# Patient Record
Sex: FEMALE
[De-identification: names, ages, dates, MRNs, and addresses within clinical notes are randomized per-mention and may not be internally consistent; named-entity substitution may affect disease eponyms.]

---

## 2017-04-25 PROBLEM — E11.59 TYPE 2 DIABETES MELLITUS WITH CIRCULATORY DISORDER, WITHOUT LONG-TERM CURRENT USE OF INSULIN (HCC): Status: ACTIVE | Noted: 2017-04-25

## 2017-04-25 PROBLEM — F41.9 ANXIETY: Status: ACTIVE | Noted: 2017-04-25

## 2017-10-24 PROBLEM — Z91.89 AT RISK FOR DIABETES MELLITUS: Status: ACTIVE | Noted: 2017-10-24

## 2018-10-02 ENCOUNTER — HOSPITAL (OUTPATIENT)
Dept: OTHER | Age: 73
End: 2018-10-02
Attending: INTERNAL MEDICINE

## 2018-10-02 LAB
ANALYZER ANC (IANC): ABNORMAL
ANION GAP SERPL CALC-SCNC: 15 MMOL/L (ref 10–20)
APTT PPP: 28 SECONDS (ref 22–30)
APTT PPP: NORMAL S
BUN SERPL-MCNC: 19 MG/DL (ref 6–20)
BUN/CREAT SERPL: 29 (ref 7–25)
CALCIUM SERPL-MCNC: 8.8 MG/DL (ref 8.4–10.2)
CHLORIDE: 107 MMOL/L (ref 98–107)
CO2 SERPL-SCNC: 27 MMOL/L (ref 21–32)
CREAT SERPL-MCNC: 0.66 MG/DL (ref 0.51–0.95)
ERYTHROCYTE [DISTWIDTH] IN BLOOD: 15.7 % (ref 11–15)
GLUCOSE SERPL-MCNC: 106 MG/DL (ref 65–99)
HEMATOCRIT: 37.3 % (ref 36–46.5)
HGB BLD-MCNC: 12.4 GM/DL (ref 12–15.5)
INR PPP: 1.4
MCH RBC QN AUTO: 30.8 PG (ref 26–34)
MCHC RBC AUTO-ENTMCNC: 33.2 GM/DL (ref 32–36.5)
MCV RBC AUTO: 92.6 FL (ref 78–100)
NRBC (NRBCRE): ABNORMAL
PLATELET # BLD: 195 THOUSAND/MCL (ref 140–450)
POTASSIUM SERPL-SCNC: 3.8 MMOL/L (ref 3.4–5.1)
PROTHROMBIN TIME: 13.6 SECONDS (ref 9.7–11.8)
PROTHROMBIN TIME: ABNORMAL
RBC # BLD: 4.03 MILLION/MCL (ref 4–5.2)
SODIUM SERPL-SCNC: 145 MMOL/L (ref 135–145)
WBC # BLD: 7.8 THOUSAND/MCL (ref 4.2–11)

## 2018-10-11 PROBLEM — I27.20 PULMONARY HTN (HCC): Status: ACTIVE | Noted: 2018-10-11

## 2019-08-08 PROBLEM — M10.9 ACUTE GOUT OF LEFT ANKLE, UNSPECIFIED CAUSE: Status: ACTIVE | Noted: 2019-08-08

## 2019-10-28 PROBLEM — I48.0 PAROXYSMAL ATRIAL FIBRILLATION (HCC): Status: ACTIVE | Noted: 2019-10-28

## 2021-10-01 PROBLEM — F10.20 ALCOHOLISM (HCC): Status: ACTIVE | Noted: 2021-10-01

## 2021-10-04 PROBLEM — F10.239 ALCOHOL WITHDRAWAL (HCC): Status: ACTIVE | Noted: 2021-10-04

## 2021-10-20 PROBLEM — Z91.89 AT RISK FOR DIABETES MELLITUS: Status: RESOLVED | Noted: 2017-10-24 | Resolved: 2021-10-20

## 2021-10-27 NOTE — H&P (VIEW-ONLY)
Patient ID: Jonathon Zafar     Chief Complaint:    Patient presents with:  Pre-Op: pre op right total hip 11/1        HPI:    Jonathon Zafar is a 78year old female who presents today for Patient presents with:  Pre-Op: pre op right total hip 11/1 EH  .  Se nausea and vomiting)    • RLS (restless legs syndrome)    • SLEEP APNEA     severe, AHI 23, REM AHI 65, O2 asif 69%.   AutoPAP 8-20   • Sleep apnea     CPAP     Past Surgical History:   Procedure Laterality Date   • ABLATION     • CABG      denies open hea PAROXETINE 10 MG Oral Tab TAKE 1 TABLET BY MOUTH EVERY DAY (Patient taking differently: TAKE 1/4 TABLET BY MOUTH EVERY DAY) 30 tablet 1   • HYDROcodone-acetaminophen (NORCO) 5-325 MG Oral Tab 1-2 tabs po q6 hours prn 40 tablet 0   • Pramipexole Dihydrochlo Height as of 10/13/21: 5' (1.524 m). Weight as of 10/13/21: 167 lb (75.8 kg).     antalgic gait with assistive device    Right hip: Limited internal (5 degrees) and external rotation (15 degrees) with pain in the groin region, Limited abduction (0-15/20 vs. BEATRIZ discussed. The patient wishes to proceed with right total hip replacement.       One or more of the conservative treatments have been tried and failed for three months or more except in special circumstances where delay of definitive care is not ap utilized. Following the completion of the discussion, the patient expressed understanding of this planned course of care, all their questions were answered and consent will be obtained preoperatively.     The risks, benefits and alternatives of surgery wer your lungs or brain. A blood clot in your lungs can cause chest pain, trouble breathing and possibly death. A blood clot in your brain can cause a stroke. These problems can be life-threatening. Pain medication discussed.       no smoking  no diabetes

## 2021-10-29 ENCOUNTER — LABORATORY ENCOUNTER (OUTPATIENT)
Dept: LAB | Facility: HOSPITAL | Age: 76
End: 2021-10-29
Payer: MEDICARE

## 2021-10-29 ENCOUNTER — LAB ENCOUNTER (OUTPATIENT)
Dept: LAB | Facility: HOSPITAL | Age: 76
End: 2021-10-29
Attending: ORTHOPAEDIC SURGERY
Payer: MEDICARE

## 2021-10-29 ENCOUNTER — ANESTHESIA EVENT (OUTPATIENT)
Dept: SURGERY | Facility: HOSPITAL | Age: 76
DRG: 470 | End: 2021-10-29
Payer: MEDICARE

## 2021-10-29 DIAGNOSIS — Z01.818 PRE-OP EXAM: ICD-10-CM

## 2021-10-29 DIAGNOSIS — Z01.818 PRE-OP TESTING: ICD-10-CM

## 2021-10-29 PROCEDURE — 80051 ELECTROLYTE PANEL: CPT

## 2021-10-29 PROCEDURE — 87081 CULTURE SCREEN ONLY: CPT

## 2021-10-29 PROCEDURE — 86900 BLOOD TYPING SEROLOGIC ABO: CPT

## 2021-10-29 PROCEDURE — 86850 RBC ANTIBODY SCREEN: CPT

## 2021-10-29 PROCEDURE — 36415 COLL VENOUS BLD VENIPUNCTURE: CPT

## 2021-10-29 PROCEDURE — 86901 BLOOD TYPING SEROLOGIC RH(D): CPT

## 2021-10-29 PROCEDURE — 85610 PROTHROMBIN TIME: CPT

## 2021-11-01 ENCOUNTER — HOSPITAL ENCOUNTER (INPATIENT)
Facility: HOSPITAL | Age: 76
LOS: 1 days | Discharge: HOME HEALTH | DRG: 470 | End: 2021-11-03
Attending: ORTHOPAEDIC SURGERY | Admitting: ORTHOPAEDIC SURGERY
Payer: MEDICARE

## 2021-11-01 ENCOUNTER — ANESTHESIA (OUTPATIENT)
Dept: SURGERY | Facility: HOSPITAL | Age: 76
DRG: 470 | End: 2021-11-01
Payer: MEDICARE

## 2021-11-01 ENCOUNTER — APPOINTMENT (OUTPATIENT)
Dept: GENERAL RADIOLOGY | Facility: HOSPITAL | Age: 76
DRG: 470 | End: 2021-11-01
Attending: ORTHOPAEDIC SURGERY
Payer: MEDICARE

## 2021-11-01 DIAGNOSIS — Z01.818 PRE-OP TESTING: Primary | ICD-10-CM

## 2021-11-01 DIAGNOSIS — M16.11 PRIMARY OSTEOARTHRITIS OF RIGHT HIP: ICD-10-CM

## 2021-11-01 PROCEDURE — 82962 GLUCOSE BLOOD TEST: CPT

## 2021-11-01 PROCEDURE — BQ101ZZ FLUOROSCOPY OF RIGHT HIP USING LOW OSMOLAR CONTRAST: ICD-10-PCS | Performed by: ORTHOPAEDIC SURGERY

## 2021-11-01 PROCEDURE — 76000 FLUOROSCOPY <1 HR PHYS/QHP: CPT | Performed by: ORTHOPAEDIC SURGERY

## 2021-11-01 PROCEDURE — 88304 TISSUE EXAM BY PATHOLOGIST: CPT | Performed by: ORTHOPAEDIC SURGERY

## 2021-11-01 PROCEDURE — 85610 PROTHROMBIN TIME: CPT

## 2021-11-01 PROCEDURE — 97530 THERAPEUTIC ACTIVITIES: CPT

## 2021-11-01 PROCEDURE — 94660 CPAP INITIATION&MGMT: CPT

## 2021-11-01 PROCEDURE — 97161 PT EVAL LOW COMPLEX 20 MIN: CPT

## 2021-11-01 PROCEDURE — 88311 DECALCIFY TISSUE: CPT | Performed by: ORTHOPAEDIC SURGERY

## 2021-11-01 PROCEDURE — 5A09357 ASSISTANCE WITH RESPIRATORY VENTILATION, LESS THAN 24 CONSECUTIVE HOURS, CONTINUOUS POSITIVE AIRWAY PRESSURE: ICD-10-PCS | Performed by: ORTHOPAEDIC SURGERY

## 2021-11-01 PROCEDURE — 0SR9039 REPLACEMENT OF RIGHT HIP JOINT WITH CERAMIC SYNTHETIC SUBSTITUTE, CEMENTED, OPEN APPROACH: ICD-10-PCS | Performed by: ORTHOPAEDIC SURGERY

## 2021-11-01 DEVICE — IMPLANTABLE DEVICE
Type: IMPLANTABLE DEVICE | Site: HIP | Status: FUNCTIONAL
Brand: PERSONA®

## 2021-11-01 DEVICE — IMPLANTABLE DEVICE
Type: IMPLANTABLE DEVICE | Site: HIP | Status: FUNCTIONAL
Brand: REFOBACIN® BONE CEMENT R

## 2021-11-01 DEVICE — BIOLOX® DELTA, CERAMIC FEMORAL HEAD, S, Ø 36/-3.5, TAPER 12/14
Type: IMPLANTABLE DEVICE | Site: HIP | Status: FUNCTIONAL
Brand: BIOLOX® DELTA

## 2021-11-01 DEVICE — IMPLANTABLE DEVICE: Type: IMPLANTABLE DEVICE | Site: HIP | Status: FUNCTIONAL

## 2021-11-01 DEVICE — IMPLANTABLE DEVICE
Type: IMPLANTABLE DEVICE | Site: HIP | Status: FUNCTIONAL
Brand: G7® ACETABULAR SYSTEM

## 2021-11-01 DEVICE — KIT FEM BONE CEMENT RESTRICTOR: Type: IMPLANTABLE DEVICE | Site: HIP | Status: FUNCTIONAL

## 2021-11-01 RX ORDER — ONDANSETRON 2 MG/ML
4 INJECTION INTRAMUSCULAR; INTRAVENOUS AS NEEDED
Status: DISCONTINUED | OUTPATIENT
Start: 2021-11-01 | End: 2021-11-01 | Stop reason: HOSPADM

## 2021-11-01 RX ORDER — LEVOTHYROXINE SODIUM 0.1 MG/1
100 TABLET ORAL
COMMUNITY
End: 2022-01-05

## 2021-11-01 RX ORDER — OXYCODONE HYDROCHLORIDE 5 MG/1
5 TABLET ORAL EVERY 4 HOURS PRN
Status: DISCONTINUED | OUTPATIENT
Start: 2021-11-01 | End: 2021-11-03

## 2021-11-01 RX ORDER — PAROXETINE 10 MG/1
2.5 TABLET, FILM COATED ORAL DAILY
Status: DISCONTINUED | OUTPATIENT
Start: 2021-11-01 | End: 2021-11-03

## 2021-11-01 RX ORDER — HYDROCODONE BITARTRATE AND ACETAMINOPHEN 5; 325 MG/1; MG/1
1 TABLET ORAL AS NEEDED
Status: DISCONTINUED | OUTPATIENT
Start: 2021-11-01 | End: 2021-11-01 | Stop reason: HOSPADM

## 2021-11-01 RX ORDER — POLYETHYLENE GLYCOL 3350 17 G/17G
17 POWDER, FOR SOLUTION ORAL DAILY PRN
Status: DISCONTINUED | OUTPATIENT
Start: 2021-11-01 | End: 2021-11-03

## 2021-11-01 RX ORDER — LANSOPRAZOLE 30 MG/1
30 CAPSULE, DELAYED RELEASE ORAL
COMMUNITY

## 2021-11-01 RX ORDER — MEPERIDINE HYDROCHLORIDE 25 MG/ML
12.5 INJECTION INTRAMUSCULAR; INTRAVENOUS; SUBCUTANEOUS AS NEEDED
Status: DISCONTINUED | OUTPATIENT
Start: 2021-11-01 | End: 2021-11-01 | Stop reason: HOSPADM

## 2021-11-01 RX ORDER — CEFAZOLIN SODIUM/WATER 2 G/20 ML
2 SYRINGE (ML) INTRAVENOUS EVERY 8 HOURS
Status: COMPLETED | OUTPATIENT
Start: 2021-11-01 | End: 2021-11-01

## 2021-11-01 RX ORDER — POTASSIUM CHLORIDE 750 MG/1
10 TABLET, FILM COATED, EXTENDED RELEASE ORAL 2 TIMES DAILY
Status: DISCONTINUED | OUTPATIENT
Start: 2021-11-01 | End: 2021-11-02

## 2021-11-01 RX ORDER — SODIUM CHLORIDE 9 MG/ML
INJECTION, SOLUTION INTRAVENOUS CONTINUOUS
Status: DISCONTINUED | OUTPATIENT
Start: 2021-11-01 | End: 2021-11-03

## 2021-11-01 RX ORDER — DILTIAZEM HYDROCHLORIDE 120 MG/1
120 CAPSULE, EXTENDED RELEASE ORAL DAILY
Status: DISCONTINUED | OUTPATIENT
Start: 2021-11-01 | End: 2021-11-03

## 2021-11-01 RX ORDER — PRAMIPEXOLE DIHYDROCHLORIDE 0.25 MG/1
0.5 TABLET ORAL NIGHTLY
Status: DISCONTINUED | OUTPATIENT
Start: 2021-11-01 | End: 2021-11-03

## 2021-11-01 RX ORDER — SODIUM PHOSPHATE, DIBASIC AND SODIUM PHOSPHATE, MONOBASIC 7; 19 G/133ML; G/133ML
1 ENEMA RECTAL ONCE AS NEEDED
Status: DISCONTINUED | OUTPATIENT
Start: 2021-11-01 | End: 2021-11-03

## 2021-11-01 RX ORDER — KETOROLAC TROMETHAMINE 30 MG/ML
30 INJECTION, SOLUTION INTRAMUSCULAR; INTRAVENOUS EVERY 6 HOURS
Status: DISCONTINUED | OUTPATIENT
Start: 2021-11-01 | End: 2021-11-01

## 2021-11-01 RX ORDER — TRAMADOL HYDROCHLORIDE 50 MG/1
50 TABLET ORAL EVERY 6 HOURS
Status: DISCONTINUED | OUTPATIENT
Start: 2021-11-01 | End: 2021-11-03

## 2021-11-01 RX ORDER — HYDROCODONE BITARTRATE AND ACETAMINOPHEN 5; 325 MG/1; MG/1
2 TABLET ORAL AS NEEDED
Status: DISCONTINUED | OUTPATIENT
Start: 2021-11-01 | End: 2021-11-01 | Stop reason: HOSPADM

## 2021-11-01 RX ORDER — OXYCODONE HYDROCHLORIDE 5 MG/1
2.5 TABLET ORAL EVERY 4 HOURS PRN
Status: DISCONTINUED | OUTPATIENT
Start: 2021-11-01 | End: 2021-11-03

## 2021-11-01 RX ORDER — EZETIMIBE 10 MG/1
10 TABLET ORAL NIGHTLY
COMMUNITY
End: 2021-12-24

## 2021-11-01 RX ORDER — ACETAMINOPHEN 500 MG
1000 TABLET ORAL ONCE
Status: DISCONTINUED | OUTPATIENT
Start: 2021-11-01 | End: 2021-11-01

## 2021-11-01 RX ORDER — WARFARIN SODIUM 5 MG/1
5 TABLET ORAL
Status: DISCONTINUED | OUTPATIENT
Start: 2021-11-01 | End: 2021-11-01

## 2021-11-01 RX ORDER — ENOXAPARIN SODIUM 100 MG/ML
40 INJECTION SUBCUTANEOUS DAILY
Status: DISCONTINUED | OUTPATIENT
Start: 2021-11-02 | End: 2021-11-03

## 2021-11-01 RX ORDER — EZETIMIBE 10 MG/1
10 TABLET ORAL NIGHTLY
Status: DISCONTINUED | OUTPATIENT
Start: 2021-11-01 | End: 2021-11-03

## 2021-11-01 RX ORDER — ONDANSETRON 2 MG/ML
4 INJECTION INTRAMUSCULAR; INTRAVENOUS EVERY 4 HOURS PRN
Status: ACTIVE | OUTPATIENT
Start: 2021-11-01 | End: 2021-11-03

## 2021-11-01 RX ORDER — KETAMINE HYDROCHLORIDE 50 MG/ML
INJECTION, SOLUTION, CONCENTRATE INTRAMUSCULAR; INTRAVENOUS AS NEEDED
Status: DISCONTINUED | OUTPATIENT
Start: 2021-11-01 | End: 2021-11-01 | Stop reason: SURG

## 2021-11-01 RX ORDER — LEVOTHYROXINE SODIUM 0.1 MG/1
100 TABLET ORAL
Status: DISCONTINUED | OUTPATIENT
Start: 2021-11-01 | End: 2021-11-03

## 2021-11-01 RX ORDER — NALOXONE HYDROCHLORIDE 0.4 MG/ML
80 INJECTION, SOLUTION INTRAMUSCULAR; INTRAVENOUS; SUBCUTANEOUS AS NEEDED
Status: DISCONTINUED | OUTPATIENT
Start: 2021-11-01 | End: 2021-11-01 | Stop reason: HOSPADM

## 2021-11-01 RX ORDER — KETOROLAC TROMETHAMINE 30 MG/ML
INJECTION, SOLUTION INTRAMUSCULAR; INTRAVENOUS AS NEEDED
Status: DISCONTINUED | OUTPATIENT
Start: 2021-11-01 | End: 2021-11-01 | Stop reason: SURG

## 2021-11-01 RX ORDER — ACETAMINOPHEN 325 MG/1
650 TABLET ORAL ONCE
Status: COMPLETED | OUTPATIENT
Start: 2021-11-01 | End: 2021-11-01

## 2021-11-01 RX ORDER — AMLODIPINE BESYLATE AND BENAZEPRIL HYDROCHLORIDE 5; 40 MG/1; MG/1
1 CAPSULE ORAL DAILY
Status: DISCONTINUED | OUTPATIENT
Start: 2021-11-01 | End: 2021-11-01 | Stop reason: SDUPTHER

## 2021-11-01 RX ORDER — AMLODIPINE BESYLATE 5 MG/1
5 TABLET ORAL DAILY
Status: DISCONTINUED | OUTPATIENT
Start: 2021-11-02 | End: 2021-11-03

## 2021-11-01 RX ORDER — ATORVASTATIN CALCIUM 80 MG/1
80 TABLET, FILM COATED ORAL NIGHTLY
COMMUNITY
End: 2021-11-09

## 2021-11-01 RX ORDER — BUPIVACAINE HYDROCHLORIDE 7.5 MG/ML
INJECTION, SOLUTION INTRASPINAL AS NEEDED
Status: DISCONTINUED | OUTPATIENT
Start: 2021-11-01 | End: 2021-11-01 | Stop reason: SURG

## 2021-11-01 RX ORDER — ACETAMINOPHEN 325 MG/1
650 TABLET ORAL 4 TIMES DAILY
Status: DISCONTINUED | OUTPATIENT
Start: 2021-11-01 | End: 2021-11-03

## 2021-11-01 RX ORDER — LISINOPRIL 40 MG/1
40 TABLET ORAL DAILY
Status: DISCONTINUED | OUTPATIENT
Start: 2021-11-02 | End: 2021-11-03

## 2021-11-01 RX ORDER — DOCUSATE SODIUM 100 MG/1
100 CAPSULE, LIQUID FILLED ORAL 2 TIMES DAILY
Status: DISCONTINUED | OUTPATIENT
Start: 2021-11-01 | End: 2021-11-03

## 2021-11-01 RX ORDER — HYDROMORPHONE HYDROCHLORIDE 1 MG/ML
0.4 INJECTION, SOLUTION INTRAMUSCULAR; INTRAVENOUS; SUBCUTANEOUS EVERY 2 HOUR PRN
Status: DISCONTINUED | OUTPATIENT
Start: 2021-11-01 | End: 2021-11-03

## 2021-11-01 RX ORDER — INSULIN ASPART 100 [IU]/ML
INJECTION, SOLUTION INTRAVENOUS; SUBCUTANEOUS ONCE
Status: DISCONTINUED | OUTPATIENT
Start: 2021-11-01 | End: 2021-11-01 | Stop reason: HOSPADM

## 2021-11-01 RX ORDER — CEFAZOLIN SODIUM/WATER 2 G/20 ML
2 SYRINGE (ML) INTRAVENOUS ONCE
Status: COMPLETED | OUTPATIENT
Start: 2021-11-01 | End: 2021-11-01

## 2021-11-01 RX ORDER — MIDAZOLAM HYDROCHLORIDE 1 MG/ML
INJECTION INTRAMUSCULAR; INTRAVENOUS AS NEEDED
Status: DISCONTINUED | OUTPATIENT
Start: 2021-11-01 | End: 2021-11-01 | Stop reason: SURG

## 2021-11-01 RX ORDER — ACETAMINOPHEN 325 MG/1
TABLET ORAL
Status: COMPLETED
Start: 2021-11-01 | End: 2021-11-01

## 2021-11-01 RX ORDER — ATORVASTATIN CALCIUM 40 MG/1
80 TABLET, FILM COATED ORAL NIGHTLY
Status: DISCONTINUED | OUTPATIENT
Start: 2021-11-01 | End: 2021-11-03

## 2021-11-01 RX ORDER — SODIUM CHLORIDE, SODIUM LACTATE, POTASSIUM CHLORIDE, CALCIUM CHLORIDE 600; 310; 30; 20 MG/100ML; MG/100ML; MG/100ML; MG/100ML
INJECTION, SOLUTION INTRAVENOUS CONTINUOUS
Status: DISCONTINUED | OUTPATIENT
Start: 2021-11-01 | End: 2021-11-01 | Stop reason: HOSPADM

## 2021-11-01 RX ORDER — DEXAMETHASONE SODIUM PHOSPHATE 4 MG/ML
VIAL (ML) INJECTION AS NEEDED
Status: DISCONTINUED | OUTPATIENT
Start: 2021-11-01 | End: 2021-11-01 | Stop reason: SURG

## 2021-11-01 RX ORDER — DEXTROSE MONOHYDRATE 25 G/50ML
50 INJECTION, SOLUTION INTRAVENOUS
Status: DISCONTINUED | OUTPATIENT
Start: 2021-11-01 | End: 2021-11-01 | Stop reason: HOSPADM

## 2021-11-01 RX ORDER — TEMAZEPAM 15 MG/1
15 CAPSULE ORAL NIGHTLY PRN
Status: DISCONTINUED | OUTPATIENT
Start: 2021-11-01 | End: 2021-11-03

## 2021-11-01 RX ORDER — ONDANSETRON 2 MG/ML
INJECTION INTRAMUSCULAR; INTRAVENOUS AS NEEDED
Status: DISCONTINUED | OUTPATIENT
Start: 2021-11-01 | End: 2021-11-01 | Stop reason: SURG

## 2021-11-01 RX ORDER — DEXAMETHASONE SODIUM PHOSPHATE 10 MG/ML
8 INJECTION, SOLUTION INTRAMUSCULAR; INTRAVENOUS ONCE
Status: COMPLETED | OUTPATIENT
Start: 2021-11-02 | End: 2021-11-02

## 2021-11-01 RX ORDER — KETOROLAC TROMETHAMINE 15 MG/ML
15 INJECTION, SOLUTION INTRAMUSCULAR; INTRAVENOUS EVERY 6 HOURS
Status: COMPLETED | OUTPATIENT
Start: 2021-11-01 | End: 2021-11-02

## 2021-11-01 RX ORDER — DIPHENHYDRAMINE HYDROCHLORIDE 50 MG/ML
25 INJECTION INTRAMUSCULAR; INTRAVENOUS ONCE AS NEEDED
Status: ACTIVE | OUTPATIENT
Start: 2021-11-01 | End: 2021-11-01

## 2021-11-01 RX ORDER — PREGABALIN 75 MG/1
75 CAPSULE ORAL DAILY
Status: DISCONTINUED | OUTPATIENT
Start: 2021-11-01 | End: 2021-11-03

## 2021-11-01 RX ORDER — HYDROMORPHONE HYDROCHLORIDE 1 MG/ML
0.4 INJECTION, SOLUTION INTRAMUSCULAR; INTRAVENOUS; SUBCUTANEOUS EVERY 5 MIN PRN
Status: DISCONTINUED | OUTPATIENT
Start: 2021-11-01 | End: 2021-11-01 | Stop reason: HOSPADM

## 2021-11-01 RX ORDER — WARFARIN SODIUM 5 MG/1
5 TABLET ORAL
Status: COMPLETED | OUTPATIENT
Start: 2021-11-01 | End: 2021-11-01

## 2021-11-01 RX ORDER — TRANEXAMIC ACID 10 MG/ML
1000 INJECTION, SOLUTION INTRAVENOUS ONCE
Status: COMPLETED | OUTPATIENT
Start: 2021-11-01 | End: 2021-11-01

## 2021-11-01 RX ORDER — HYDROMORPHONE HYDROCHLORIDE 1 MG/ML
0.2 INJECTION, SOLUTION INTRAMUSCULAR; INTRAVENOUS; SUBCUTANEOUS EVERY 2 HOUR PRN
Status: DISCONTINUED | OUTPATIENT
Start: 2021-11-01 | End: 2021-11-03

## 2021-11-01 RX ORDER — SODIUM CHLORIDE, SODIUM LACTATE, POTASSIUM CHLORIDE, CALCIUM CHLORIDE 600; 310; 30; 20 MG/100ML; MG/100ML; MG/100ML; MG/100ML
INJECTION, SOLUTION INTRAVENOUS CONTINUOUS
Status: DISCONTINUED | OUTPATIENT
Start: 2021-11-01 | End: 2021-11-03

## 2021-11-01 RX ORDER — PANTOPRAZOLE SODIUM 40 MG/1
40 TABLET, DELAYED RELEASE ORAL
Status: DISCONTINUED | OUTPATIENT
Start: 2021-11-02 | End: 2021-11-03

## 2021-11-01 RX ORDER — DIPHENHYDRAMINE HCL 25 MG
25 CAPSULE ORAL EVERY 4 HOURS PRN
Status: DISCONTINUED | OUTPATIENT
Start: 2021-11-01 | End: 2021-11-03

## 2021-11-01 RX ORDER — PROCHLORPERAZINE EDISYLATE 5 MG/ML
10 INJECTION INTRAMUSCULAR; INTRAVENOUS EVERY 6 HOURS PRN
Status: ACTIVE | OUTPATIENT
Start: 2021-11-01 | End: 2021-11-03

## 2021-11-01 RX ORDER — ACETAMINOPHEN 500 MG
1000 TABLET ORAL EVERY 6 HOURS PRN
Status: ON HOLD | COMMUNITY
End: 2021-11-01

## 2021-11-01 RX ORDER — PHENYLEPHRINE HCL 10 MG/ML
VIAL (ML) INJECTION AS NEEDED
Status: DISCONTINUED | OUTPATIENT
Start: 2021-11-01 | End: 2021-11-01 | Stop reason: SURG

## 2021-11-01 RX ORDER — SENNOSIDES 8.6 MG
17.2 TABLET ORAL NIGHTLY
Status: DISCONTINUED | OUTPATIENT
Start: 2021-11-01 | End: 2021-11-03

## 2021-11-01 RX ORDER — METOPROLOL SUCCINATE 50 MG/1
50 TABLET, EXTENDED RELEASE ORAL 2 TIMES DAILY
COMMUNITY
End: 2022-01-21

## 2021-11-01 RX ORDER — BUMETANIDE 2 MG/1
2 TABLET ORAL
Status: DISCONTINUED | OUTPATIENT
Start: 2021-11-01 | End: 2021-11-03

## 2021-11-01 RX ORDER — METOPROLOL SUCCINATE 50 MG/1
50 TABLET, EXTENDED RELEASE ORAL 2 TIMES DAILY
Status: DISCONTINUED | OUTPATIENT
Start: 2021-11-01 | End: 2021-11-03

## 2021-11-01 RX ORDER — DIPHENHYDRAMINE HYDROCHLORIDE 50 MG/ML
12.5 INJECTION INTRAMUSCULAR; INTRAVENOUS EVERY 4 HOURS PRN
Status: DISCONTINUED | OUTPATIENT
Start: 2021-11-01 | End: 2021-11-03

## 2021-11-01 RX ORDER — ALLOPURINOL 100 MG/1
200 TABLET ORAL DAILY
Status: DISCONTINUED | OUTPATIENT
Start: 2021-11-01 | End: 2021-11-03

## 2021-11-01 RX ORDER — LIDOCAINE HYDROCHLORIDE 10 MG/ML
INJECTION, SOLUTION EPIDURAL; INFILTRATION; INTRACAUDAL; PERINEURAL AS NEEDED
Status: DISCONTINUED | OUTPATIENT
Start: 2021-11-01 | End: 2021-11-01 | Stop reason: SURG

## 2021-11-01 RX ORDER — BISACODYL 10 MG
10 SUPPOSITORY, RECTAL RECTAL
Status: DISCONTINUED | OUTPATIENT
Start: 2021-11-01 | End: 2021-11-03

## 2021-11-01 RX ADMIN — PHENYLEPHRINE HCL 200 MCG: 10 MG/ML VIAL (ML) INJECTION at 08:12:00

## 2021-11-01 RX ADMIN — CEFAZOLIN SODIUM/WATER 2 G: 2 G/20 ML SYRINGE (ML) INTRAVENOUS at 07:20:00

## 2021-11-01 RX ADMIN — KETOROLAC TROMETHAMINE 30 MG: 30 INJECTION, SOLUTION INTRAMUSCULAR; INTRAVENOUS at 08:55:00

## 2021-11-01 RX ADMIN — KETAMINE HYDROCHLORIDE 50 MG: 50 INJECTION, SOLUTION, CONCENTRATE INTRAMUSCULAR; INTRAVENOUS at 07:35:00

## 2021-11-01 RX ADMIN — PHENYLEPHRINE HCL 200 MCG: 10 MG/ML VIAL (ML) INJECTION at 08:26:00

## 2021-11-01 RX ADMIN — ONDANSETRON 4 MG: 2 INJECTION INTRAMUSCULAR; INTRAVENOUS at 08:43:00

## 2021-11-01 RX ADMIN — DEXAMETHASONE SODIUM PHOSPHATE 8 MG: 4 MG/ML VIAL (ML) INJECTION at 07:18:00

## 2021-11-01 RX ADMIN — MIDAZOLAM HYDROCHLORIDE 2 MG: 1 INJECTION INTRAMUSCULAR; INTRAVENOUS at 07:01:00

## 2021-11-01 RX ADMIN — TRANEXAMIC ACID 1000 MG: 10 INJECTION, SOLUTION INTRAVENOUS at 07:23:00

## 2021-11-01 RX ADMIN — PHENYLEPHRINE HCL 200 MCG: 10 MG/ML VIAL (ML) INJECTION at 08:09:00

## 2021-11-01 RX ADMIN — LIDOCAINE HYDROCHLORIDE 50 MG: 10 INJECTION, SOLUTION EPIDURAL; INFILTRATION; INTRACAUDAL; PERINEURAL at 09:04:00

## 2021-11-01 RX ADMIN — BUPIVACAINE HYDROCHLORIDE 1.3 ML: 7.5 INJECTION, SOLUTION INTRASPINAL at 07:08:00

## 2021-11-01 NOTE — INTERVAL H&P NOTE
Pre-op Diagnosis: Primary osteoarthritis of right hip [M16.11]    The above referenced H&P was reviewed by Enoc Lin MD on 11/1/2021, the patient was examined and no significant changes have occurred in the patient's condition since the H&P was perfor

## 2021-11-01 NOTE — ANESTHESIA PREPROCEDURE EVALUATION
PRE-OP EVALUATION    Patient Name: Oscar Walker    Admit Diagnosis: Primary osteoarthritis of right hip [M16.11]    Pre-op Diagnosis: Primary osteoarthritis of right hip [M16.11]    RIGHT ANTERIOR TOTAL HIP ARTHROPLASTY     Anesthesia Procedure: RIGHT ANTE Dihydrochloride 0.5 MG Oral Tab, Take 1 tablet (0.5 mg total) by mouth nightly. At Bedtime, Disp: 90 tablet, Rfl: 0, 10/31/2021 at 1900  allopurinol 100 MG Oral Tab, Take 2 tablets (200 mg total) by mouth daily. , Disp: 180 tablet, Rfl: 0, 10/31/2021 at 080 Disp: 14 capsule, Rfl: 0, post op  HYDROcodone-acetaminophen (NORCO) 5-325 MG Oral Tab, 1-2 tabs po q6 hours prn, Disp: 40 tablet, Rfl: 0, post op  Accu-Chek FastClix Lancets Does not apply Misc, Test once daily  Dx Code: 250.00  NIDDM, Disp: 102 each, Rfl Procedure Laterality Date   • ABLATION     • CABG      denies open heart bypass surgery   • CATH DRUG ELUTING STENT      x1 stent- 2000   • COLONOSCOPY,DIAGNOSTIC  9/21/2010    Performed by [facility] at [facility]   • HYSTERECTOMY     • 12.0 10/12/2021    HCT 37.8 10/12/2021    MCV 99.0 10/12/2021    MCH 31.4 10/12/2021    MCHC 31.7 10/12/2021    RDW 15.9 10/12/2021     10/12/2021     Lab Results   Component Value Date     10/29/2021     10/12/2021    K 4.2 10/29/2021

## 2021-11-01 NOTE — PROGRESS NOTES
Assumed care at 1600  Pt states she has some blurry vision  Notified Dr. James Byrd at , stated ketamine was given in pacu  Left pupil smaller than right, pt states this is normal for her.   wbat  Up to chair

## 2021-11-01 NOTE — PROGRESS NOTES
[facility] reviewed with patient. She verbalized understanding. They received a referral on this patient, when they called her she states she sees Dr Esvin Campbell and wants to stay with him, she said we needed to send referral to him

## 2021-11-01 NOTE — OPERATIVE REPORT
OPERATIVE REPORT    Facility:  [facility]    Patient Name:  Umberto Kim    Age/Gender:  78year old female  :  1945    MRN:  [id number]    Date of Operation:  2021    Preoperative Diagnosis:  right Hip osteoarthritis    Postoperative discrepancy and dislocation risks were also discussed at length. It was explained that if tissue has been repaired or reconstructed, there is also a chance of failure which may require further management.   In addition, we have discussed the risks, includi angle.  The acetabular shell was then inserted again using fluoroscopy to assess abduction angle, anteversion and complete seating of the acetabular component.   Two screws were inserted through acetabular cup for additional fixation after pre-drilling and copiously irrigated and the tag sutures in the capsule were tied together closing the anterior hip capsule. Local anesthetic and pain medicine was injected into the hip capsule and surrounding soft tissues.   The fascia filiberto was closed with absorbable sutu

## 2021-11-01 NOTE — CM/SW NOTE
Patient was referred to [facility] pre-op, however they were unable to accept and re-referred to [address] [place] [address] Drive: [phone]  F: [phone].   Information placed on AVS.    Mishel Sawant LCSW

## 2021-11-01 NOTE — ANESTHESIA POSTPROCEDURE EVALUATION
[facility] Patient Status:  Outpatient in a Bed   Age/Gender 78year old female MRN [id number]   Location [facility] Attending Ileana Jones MD   Hosp Day # 0 PCP Yessenia Lennon MD       Anesthesia Post-

## 2021-11-01 NOTE — ANESTHESIA PROCEDURE NOTES
Spinal Block  Performed by: Remington Aquino MD  Authorized by: Remington Aquino MD       General Information and Staff    Start Time:   Anesthesiologist: Remington Aquino MD  Performed by:   Anesthesiologist  Site identification: surface landmarks    Preanest

## 2021-11-01 NOTE — CONSULTS
.  Reason for consult: periop mgmt    Consulted by: Dr. Mallorie Rosenthal    PCP: Segun Roberts MD      History of Present Illness: Patient is a 78year old female with PMH sig for hip oa, afib on coumadin, htn/hl, hypothyroidism who presented for BEATRIZ.  Doing well, PREOPERATIVE ORDER FOR IV ANTIBIOTIC SURGICAL SITE INFECTION PROPHYLAXIS.  Left 8/3/2015    Procedure: LEFT PHACOEMULSIFICATION OF CATARACT WITH INTRAOCULAR LENS IMPLANT 96728;  Surgeon: Hoa Abbott MD;  Location: [address] [place] [address]   • REMOVAL OF 3  bumetanide 2 MG Oral Tab, Take 1 tablet (2 mg total) by mouth 2 (two) times daily. , Disp: 180 tablet, Rfl: 3        Scheduled Medication:  • Senna  17.2 mg Oral Nightly   • docusate sodium  100 mg Oral BID   • ceFAZolin  2 g Intravenous Q8H   • acetamin wine, 1 - 2 Cans of beer per week      Comment: 14/week       Fam Hx  Family History   Problem Relation Age of Onset   • Heart Disorder Father    • Heart Disorder Mother        Review of Systems  Comprehensive ROS reviewed and negative except for what's st INDICATIONS:  Hip replacement. COMPARISON:  None. TECHNIQUE:  FLUOROSCOPY IMAGES OBTAINED:  2 FLUOROSCOPY TIME:  1 min 36 sec TECHNOLOGIST TIME:  1 hr and 45 min RADIATION DOSE (AIR KERMA PRODUCT):  25.42mGy  FINDINGS:  2 spot images.   Right hip arthrop

## 2021-11-01 NOTE — PHYSICAL THERAPY NOTE
PHYSICAL THERAPY  HIP EVALUATION - INPATIENT     Room Number: 363/363-A  Evaluation Date: 11/1/2021  Type of Evaluation: Initial  Physician Order: PT Eval and Treat    Presenting Problem: s/p R anterior BEATRIZ on 11/1/21  Reason for Therapy: Mobility Dysfunct Marce Ahumada at [facility]   • OTHER SURGICAL HISTORY      ex lap, LSO   • PATIENT DOCUMENTED NOT TO HAVE EXPERIENCED ANY OF THE FOLLOWING EVENTS Left 8/3/2015    Procedure: LEFT PHACOEMULSIFICATION OF CATARACT WITH INTRAOCULAR LENS IMPLANT functional limits    RANGE OF MOTION AND STRENGTH ASSESSMENT  Upper extremity ROM and strength are within functional limits     Lower extremity ROM is within functional limits     Lower extremity strength is within functional limits except for the followin and gait pattern. Mobility as indicated above. VC's provided for expectations, encouragement, reassurance, t/f tech's, pain control tech's, gait pattern. Thera ex: AP's; Heel slides B'ly (with assist to RLE) x 5; SLR B'ly (with assist to RLE) x 5. functional limitations in independent bed mobility, transfers and gait. The patient is below baseline and would benefit from skilled inpatient PT to address the above deficits to assist patient in returning to prior level of function.     DISCHARGE RECOMM

## 2021-11-01 NOTE — PROGRESS NOTES
NURSING ADMISSION NOTE      Patient admitted via Cart/bed from PACU post right total hip replacement by Dr Sharif Thomas. Oriented to room. Alert and oriented x 4, accompanied by her . Safety precautions initiated.   Denied pain to right hip on asse

## 2021-11-01 NOTE — CONSULTS
[facility] Dosing Service  Warfarin (Coumadin) Initial Dosing    Lexie Teague is a 78year old patient for whom pharmacy has been consulted to dose warfarin (COUMADIN) for Prophylaxis of venous thrombosis by Dr. Brennan Tucker.   Based on this indication, goal I

## 2021-11-01 NOTE — INTERVAL H&P NOTE
Pre-op Diagnosis: Primary osteoarthritis of right hip [M16.11]    The above referenced H&P was reviewed by Elisabeth Hartmann MD on 11/1/2021, the patient was examined and no significant changes have occurred in the patient's condition since the H&P was perfor

## 2021-11-02 ENCOUNTER — APPOINTMENT (OUTPATIENT)
Dept: GENERAL RADIOLOGY | Facility: HOSPITAL | Age: 76
DRG: 470 | End: 2021-11-02
Attending: ORTHOPAEDIC SURGERY
Payer: MEDICARE

## 2021-11-02 ENCOUNTER — APPOINTMENT (OUTPATIENT)
Dept: MRI IMAGING | Facility: HOSPITAL | Age: 76
DRG: 470 | End: 2021-11-02
Attending: HOSPITALIST
Payer: MEDICARE

## 2021-11-02 PROBLEM — Z01.818 PRE-OP TESTING: Status: ACTIVE | Noted: 2021-11-02

## 2021-11-02 PROCEDURE — 70551 MRI BRAIN STEM W/O DYE: CPT | Performed by: HOSPITALIST

## 2021-11-02 PROCEDURE — 97530 THERAPEUTIC ACTIVITIES: CPT

## 2021-11-02 PROCEDURE — 82962 GLUCOSE BLOOD TEST: CPT

## 2021-11-02 PROCEDURE — 97535 SELF CARE MNGMENT TRAINING: CPT

## 2021-11-02 PROCEDURE — 97165 OT EVAL LOW COMPLEX 30 MIN: CPT

## 2021-11-02 PROCEDURE — 97116 GAIT TRAINING THERAPY: CPT

## 2021-11-02 PROCEDURE — 73501 X-RAY EXAM HIP UNI 1 VIEW: CPT | Performed by: ORTHOPAEDIC SURGERY

## 2021-11-02 PROCEDURE — 85610 PROTHROMBIN TIME: CPT

## 2021-11-02 RX ORDER — DEXTROSE MONOHYDRATE 25 G/50ML
50 INJECTION, SOLUTION INTRAVENOUS
Status: DISCONTINUED | OUTPATIENT
Start: 2021-11-02 | End: 2021-11-03

## 2021-11-02 RX ORDER — POTASSIUM CHLORIDE 750 MG/1
10 TABLET, EXTENDED RELEASE ORAL 2 TIMES DAILY
Status: DISCONTINUED | OUTPATIENT
Start: 2021-11-02 | End: 2021-11-03

## 2021-11-02 RX ORDER — WARFARIN SODIUM 5 MG/1
5 TABLET ORAL
Status: COMPLETED | OUTPATIENT
Start: 2021-11-02 | End: 2021-11-02

## 2021-11-02 RX ORDER — CEPHALEXIN 500 MG/1
500 CAPSULE ORAL EVERY 12 HOURS SCHEDULED
Status: DISCONTINUED | OUTPATIENT
Start: 2021-11-02 | End: 2021-11-03

## 2021-11-02 NOTE — PLAN OF CARE
Problem: s/p right anterior total hip arthroplasty  Data: Patient alert and oriented overnight, 2L O2 per NC while awake and wearing cpap with sleep to maintain saturation >90%. Surgical dressing to patient's right hip C/D/I.  Patient up in room with assist appropriate  Outcome: Progressing     Problem: SAFETY ADULT - FALL  Goal: Free from fall injury  Description: INTERVENTIONS:  - Assess pt frequently for physical needs  - Identify cognitive and physical deficits and behaviors that affect risk of falls.   -

## 2021-11-02 NOTE — PLAN OF CARE
Patient's pain controlled on scheduled meds/icepacks. Discussed need to notify RN of increased pain/discomfort when resting. Discussed need to CDB, use IS and do ankle pump exercises. IS up to 1500. O2 sat 99% on RA.   Audible wheezing noted when up in ba

## 2021-11-02 NOTE — OCCUPATIONAL THERAPY NOTE
OCCUPATIONAL THERAPY QUICK EVALUATION - INPATIENT    Room Number: 363/363-A  Evaluation Date: 11/2/2021     Type of Evaluation: Quick Eval  Presenting Problem: s/p R BEATRIZ 11/1/21    Physician Order: IP Consult to Occupational Therapy  Reason for Therapy:  A FOLLOWING EVENTS Left 8/3/2015    Procedure: LEFT PHACOEMULSIFICATION OF CATARACT WITH INTRAOCULAR LENS IMPLANT [id number];  Surgeon: Maeve Raza MD;  Location: [address] [place] [address]   • PATIENT [address] [place] [address] FOR IV ANTIBIOTIC SURGICAL SITE within functional limits     NEUROLOGICAL FINDINGS  Neurological Findings: None                ACTIVITY TOLERANCE                         O2 SATURATIONS  Oxygen Therapy  SPO2% on Room Air at Rest: 94    ACTIVITIES OF DAILY LIVING ASSESSMENT  AM-PAC ‘6-Clic light within reach; All patient questions and concerns addressed;SCDs in place; Ice applied; Alarm set    ASSESSMENT   Patient is a 78year old female admitted 11/1/2021 for R BEATRIZ. Patient seen this am for OT evauation.  In this OT eval patient performed all A tasks    ---------------------------------------------------------------------------------------------------------  PPE worn by therapist this session: Surgical mask, gloves  PPE worn by patient this session: None

## 2021-11-02 NOTE — PROGRESS NOTES
[facility] Hospitalist note    PCP: Ivan Ramírez MD    Chief Complaint:  F/u selene    SUBJECTIVE:  Continues to have blurred vision, does not feel much better than yesterday.      OBJECTIVE:  Temp:  [97.5 °F (36.4 °C)-98.1 °F (36.7 °C)] 98.1 °F (36.7 °C)  Pulse mg Oral BID (Diuretic)   • dilTIAZem  120 mg Oral Daily   • ezetimibe  10 mg Oral Nightly   • pantoprazole  40 mg Oral QAM AC   • levothyroxine  100 mcg Oral Before breakfast   • metoprolol succinate  50 mg Oral BID   • PARoxetine  2.5 mg Oral Daily   • pr

## 2021-11-02 NOTE — CONSULTS
[facility] Dosing Service  Warfarin (Coumadin) Subsequent Dosing    Bernardo Marina is a 78year old patient for whom pharmacy is dosing warfarin (Coumadin).  Goal INR is 2-3    Recent Labs   Lab 10/28/21  0000 10/29/21  1150 11/01/21  0616 11/02/21  1202

## 2021-11-02 NOTE — PLAN OF CARE
Patient continues to c/o \"dried, blurry eyes\". Received order from Dr. Rubi Guerrero for natural tears. Discussed new order with patient and Kay rocha. Awaiting med to be sent from pharmacy.

## 2021-11-02 NOTE — PROGRESS NOTES
Progress Note    Patient: Sharon Harris  Medical record #: [id number]    Sharon Harris is a 78year old  female who is POD #1 right BEATRIZ. The patient's main complaint today is surgical pain at the operative site. Denies paresthesias/CP/SOA.  Patient has been (DILAUDID) 1 MG/ML injection 0.2 mg, 0.2 mg, Intravenous, Q2H PRN   Or  HYDROmorphone HCl (DILAUDID) 1 MG/ML injection 0.4 mg, 0.4 mg, Intravenous, Q2H PRN  tiZANidine HCl (ZANAFLEX) partial tablet 1 mg, 1 mg, Oral, Q6H PRN  dexamethasone PF (DECADRON) 10 temperature 97.8 °F (36.6 °C), temperature source Oral, resp. rate 18, height 5' (1.524 m), weight 172 lb 2.9 oz (78.1 kg), SpO2 99 %.     Physical Exam:     right Leg:  Dressing: clean and dry  Able to dorsiflex ankle and move toes  Sensation grossly intac

## 2021-11-02 NOTE — PHYSICAL THERAPY NOTE
PHYSICAL THERAPY HIP TREATMENT NOTE - INPATIENT      Room Number: 363/363-A     Session: 1  Number of Visits to Meet Established Goals: 2    Presenting Problem: s/p R anterior BEATRIZ on 11/1/21    Problem List  Active Problems:    Pre-op testing      History [place], [facility]   • OTHER SURGICAL HISTORY      ex lap, LSO   • PATIENT DOCUMENTED NOT TO HAVE EXPERIENCED ANY OF THE FOLLOWING EVENTS Left 8/3/2015    Procedure: LEFT PHACOEMULSIFICATION OF CATARACT WITH INTRAOCULAR LENS IMPLANT 68028;  Surgeon: Sanjuana Mccormack (including adjusting bedclothes, sheets and blankets)?: None   Patient Difficulty: Sitting down on and standing up from a chair with arms (e.g., wheelchair, bedside commode, etc.): None   Patient Difficulty: Moving from lying on back to sitting on the side chair;Needs met;Call light within reach;RN aware of session/findings; All patient questions and concerns addressed;SCDs in place; Ice applied; Alarm set    ASSESSMENT   Pt with improved endurance for mobility, but still a little SOB after ambulation of 150 ft

## 2021-11-03 VITALS
HEIGHT: 60 IN | TEMPERATURE: 98 F | HEART RATE: 92 BPM | WEIGHT: 172.19 LBS | RESPIRATION RATE: 18 BRPM | DIASTOLIC BLOOD PRESSURE: 72 MMHG | BODY MASS INDEX: 33.8 KG/M2 | OXYGEN SATURATION: 96 % | SYSTOLIC BLOOD PRESSURE: 117 MMHG

## 2021-11-03 PROCEDURE — 97116 GAIT TRAINING THERAPY: CPT

## 2021-11-03 PROCEDURE — 85610 PROTHROMBIN TIME: CPT

## 2021-11-03 PROCEDURE — 82962 GLUCOSE BLOOD TEST: CPT

## 2021-11-03 PROCEDURE — 97110 THERAPEUTIC EXERCISES: CPT

## 2021-11-03 NOTE — PLAN OF CARE
Pt A&Ox4, VSS on room air- IS encouraged. MARTÍNEZ w/ CPAP at night. Tele: a-fib. SCDs to BLE. Aquacel to R hip C/D/I. No complaint of pain at this time. Tolerating regular diet- accuchecks started. Voiding w/o difficulty; last BM 11/2/21.  Pt reporting blurred

## 2021-11-03 NOTE — CONSULTS
120 [facility] Dosing Service  Warfarin (Coumadin) Subsequent Dosing    Norm Tia is a 78year old patient for whom pharmacy is dosing warfarin (Coumadin).  Goal INR is 2-3    Recent Labs   Lab 10/28/21  0000 10/29/21  1150 11/01/21  0616 11/02/21  1202 11

## 2021-11-03 NOTE — DISCHARGE SUMMARY
Discharge Summary    Patient: Lilly Katz  Medical Record #: [id number]  Age: 78year old  : 1945    Admit date: 2021    Discharge date: 11/3/2021     Attending physician: Ysabel Galan.  Leila Aguilar MD    Admission diagnosis: Primary osteoarthritis o

## 2021-11-03 NOTE — PROGRESS NOTES
[facility] Hospitalist note    PCP: Hiral Agudelo MD    Chief Complaint:  F/u selene    SUBJECTIVE:  Vision improved today    OBJECTIVE:  Temp:  [97.4 °F (36.3 °C)-98.3 °F (36.8 °C)] 98 °F (36.7 °C)  Pulse:  [] 92  Resp:  [12-18] 18  BP: ()/(52-86) 1 mg Oral Daily   • atorvastatin  80 mg Oral Nightly   • bumetanide  2 mg Oral BID (Diuretic)   • dilTIAZem  120 mg Oral Daily   • ezetimibe  10 mg Oral Nightly   • pantoprazole  40 mg Oral QAM AC   • levothyroxine  100 mcg Oral Before breakfast   • metoprol

## 2021-11-03 NOTE — PHYSICAL THERAPY NOTE
PHYSICAL THERAPY HIP TREATMENT NOTE - INPATIENT      Room Number: 363/363-A     Session: 2  Number of Visits to Meet Established Goals: 2    Presenting Problem: s/p R anterior BEATRIZ on 11/1/21    Problem List  Active Problems:    Pre-op testing      History [place], [facility]   • OTHER SURGICAL HISTORY      ex lap, LSO   • PATIENT DOCUMENTED NOT TO HAVE EXPERIENCED ANY OF THE FOLLOWING EVENTS Left 8/3/2015    Procedure: LEFT PHACOEMULSIFICATION OF CATARACT WITH INTRAOCULAR LENS IMPLANT 59387;  Surgeon: Cynthia Peter from lying on back to sitting on the side of the bed?: None   How much help from another person does the patient currently need. ..    Help from Another: Moving to and from a bed to a chair (including a wheelchair)?: None   Help from Another: Need to walk in Treatment Plan: Endurance; Patient education; Family education;Gait training;Strengthening;Stair training;Transfer training;Balance training;Stoop training; Other (Comment) (pain management)  Rehab Potential : Good  Frequency (Obs): Daily    CURRENT GOALS

## 2021-11-03 NOTE — RESPIRATORY THERAPY NOTE
Patient used cpap for 8.5 hrs last night Quality 111:Pneumonia Vaccination Status For Older Adults: Pneumococcal Vaccination Previously Received Detail Level: Zone Quality 110: Preventive Care And Screening: Influenza Immunization: Influenza Immunization not Administered because Patient Refused.

## 2021-11-03 NOTE — PLAN OF CARE
Pt. A/Ox4, Denies having blurred vision today. on Room air, encouraged to use cpap a night Tolerates regular diet with excellent appetite. Pain controlled with scheduled medications. No acute distress noted.  Pt. Denies numbness and tingling to Lower extrem

## 2021-12-01 PROBLEM — Z96.641 STATUS POST TOTAL HIP REPLACEMENT, RIGHT: Status: ACTIVE | Noted: 2021-12-01

## 2021-12-06 PROBLEM — H05.20 UNSPECIFIED EXOPHTHALMOS: Status: ACTIVE | Noted: 2021-12-06

## 2021-12-06 PROBLEM — Z01.818 PRE-OP TESTING: Status: RESOLVED | Noted: 2021-11-02 | Resolved: 2021-12-06

## (undated) DEVICE — SUTURE VICRYL 2-0 CP-1

## (undated) DEVICE — 1010 S-DRAPE TOWEL DRAPE 10/BX: Brand: STERI-DRAPE™

## (undated) DEVICE — HOOD, PEEL-AWAY: Brand: FLYTE

## (undated) DEVICE — STERILE POLYISOPRENE POWDER-FREE SURGICAL GLOVES: Brand: PROTEXIS

## (undated) DEVICE — SUTURE MONOCRYL 3-0 PS-2

## (undated) DEVICE — SUTURE STRATAFIX PDS PLUS 45CM

## (undated) DEVICE — SHEET,DRAPE,70X100,STERILE: Brand: MEDLINE

## (undated) DEVICE — WRAP COOLING HIP W/ICE PILLOW

## (undated) DEVICE — NEEDLE SPINAL 18X3-1/2 PINK.

## (undated) DEVICE — Device: Brand: POWER-FLO®

## (undated) DEVICE — SUTURE FIBERWIRE S AR-7200

## (undated) DEVICE — STERILE SYNTHETIC POLYISOPRENE POWDER-FREE SURGICAL GLOVES WITH HYDROGEL COATING, SMOOTH FINISH, STRAIGHT FINGER: Brand: PROTEXIS

## (undated) DEVICE — SUTURE VICRYL 0 CP-1

## (undated) DEVICE — SOL  .9 3000ML

## (undated) DEVICE — COVER,BOOT,FOAM,NON-SKID,HOOK-LOOP,XLG: Brand: MEDLINE INDUSTRIES, INC.

## (undated) DEVICE — Device: Brand: STABLECUT®

## (undated) DEVICE — ANTERIOR HIP: Brand: MEDLINE INDUSTRIES, INC.

## (undated) DEVICE — 3M™ IOBAN™ 2 ANTIMICROBIAL INCISE DRAPE 6650EZ: Brand: IOBAN™ 2

## (undated) DEVICE — LIGHT HANDLE

## (undated) DEVICE — BIPOLAR SEALER 23-112-1 AQM 6.0: Brand: AQUAMANTYS™

## (undated) DEVICE — 1016 S-DRAPE IRRIG POUCH 10/BOX: Brand: STERI-DRAPE™

## (undated) DEVICE — BLADE ELECTRODE: Brand: EDGE

## (undated) DEVICE — 3M™ STERI-DRAPE™ INSTRUMENT POUCH 1018: Brand: STERI-DRAPE™

## (undated) DEVICE — SYRINGE 30ML LL TIP

## (undated) DEVICE — BIT DRL 30MM 3.2MM RNGLC ACTB

## (undated) DEVICE — POSITIONER OR KT PT CR

## (undated) NOTE — IP AVS SNAPSHOT
Patient Demographics     Address  [address] [address] Phone  [phone] [facility])  [phone] (Mobile) *Preferred* E-mail Address  [email or web address]      Emergency Contact(s)     Name Relation Home Work Mobile    Reymundo Araya Spouse 6 Discuss this at follow-up office visit. • Not allowed while taking narcotic pain medication or muscle relaxants. Sex  • Usually allowed after four to six weeks – check with surgeon at your office visit.         Return to work  • Usually allowed after f toothbrushes only. • Do not take aspirin while taking blood thinners unless ordered by your physician. • Review anticoagulant education information sheet provided. Discomfort  • Surgical discomfort is normal for one to two months.   • Have realistic go infection and promotion of healing  • Good hand washing is important. Everyone should wash their hands or use hand  as soon as they walk in your house-whether they live there or are visiting. • Keep bed linen/clothing freshly laundered.   • Do not >101 F.  • Increased pain at incision not relieved by pain medication.         Signs of Possible Dislocation  • Increased severe leg or groin pain  • Turning in or out of surgical leg that is new  • Increased numbness, tingling to leg  • Inability to walk o each other and do not give you enough room. SPECIAL  INSTRUCTIONS:    View hip discharge education at [email or web address]. Choose after surgery information.     Spanda- (compression sleeve) for HIPS    • All patients should wear the comp atorvastatin 80 MG Tabs  Commonly known as: LIPITOR      Take 80 mg by mouth nightly. bumetanide 2 MG Tabs  Commonly known as: BUMEX      Take 1 tablet (2 mg total) by mouth 2 (two) times daily.    Zahida Blackwell MD         celecoxib 200 MG Caps Tabs  Commonly known as: SENOKOT S  Next dose due: laxative      Take 1 tablet by mouth daily. Take daily while taking narcotics regularly for pain. Wade Hawkins MD         traMADol 50 MG Tabs  Commonly known as: ULTRAM  Next dose due:  Moderate pain  N (LIPITOR) tab 80 mg 11/02/21 2127 Given      [id number] bumetanide (BUMEX) tab 2 mg 11/02/21 1758 Given      [id number] bumetanide (BUMEX) tab 2 mg 11/03/21 0845 Given      [id number] cephalexin (KEFLEX) cap 500 mg 11/02/21 2126 Given      [id number] cephalexi Action Reason Comments    [id number] Insulin Aspart Pen (NOVOLOG) 100 UNIT/ML flexpen 1-5 Units 11/03/21 1327 Given            RIGHT LOWER ABDOMEN     Order ID Medication Name Action Time Action Reason Comments    [id number] enoxaparin (LOVENOX) 40 MG/0.4ML PM Status: Addendum    : Wendy Rockwell MD (Physician)    Related Notes: Original Note by Wendy Rockwell MD (Physician) filed at 11/1/2021  5:28 PM     Consult Orders    1.  Consult to Hospitalist [[id number]] ordered by Bianca Mojica MD at 11/01/21 06 ANDRADE wu   • PATIENT DOCUMENTED NOT TO HAVE EXPERIENCED ANY OF THE FOLLOWING EVENTS Left 8/3/2015    Procedure: LEFT PHACOEMULSIFICATION OF CATARACT WITH INTRAOCULAR LENS IMPLANT 37082;  Surgeon: Deepti Cotto MD;  Location: [address] [place] [address]   • 0  [DISCONTINUED] METOPROLOL SUCCINATE ER 50 MG Oral Tablet 24 Hr, TAKE 1 TABLET BY MOUTH TWICE A DAY, Disp: 180 tablet, Rfl: 1  Potassium Chloride ER 10 MEQ Oral Tab CR, Take 2 tablets (20 mEq) in the morning and take 1 tablet (10mEq) in the evening., Dis Packs/day: 0.50        Years: 0.50        Pack years: .25        Types: Cigarettes        Quit date: 1963        Years since quittin.4      Smokeless tobacco: Never Used    Alcohol use:  Yes      Alcohol/week: 1.7 - 3.3 standard drinks      Types: for input(s): ALT, AST, ALB, AMYLASE, LIPASE, LDH in the last 168 hours.     Invalid input(s): ALPHOS, TBIL, DBIL, TPROT    Radiology: XR FLUOROSCOPY C-ARM TIME <1 HOUR  (CPT=76000)    Result Date: 11/1/2021  PROCEDURE:  XR FLUOROSCOPY C-ARM TIME <1 HOUR  ( this type exist for this encounter. Imaging Results (HF patients)    Chest X-Ray Results (HF patients only)    No exam resulted this encounter. 2D Echocardiogram Results (HF patients only)    No exam resulted this encounter.       Cath Angiogram Re Osteoarthritis     right hip   • PONV (postoperative nausea and vomiting)    • RLS (restless legs syndrome)    • SLEEP APNEA     severe, AHI 23, REM AHI 65, O2 asif 69%.   AutoPAP 8-20   • Sleep apnea     CPAP       Past Surgical History  Past Surgical His PAIN ASSESSMENT   Rating: 3  Location: R hip incision, pulling  Management Techniques: Activity promotion; Body mechanics;Repositioning; Other (Comment) (coordinate w/pain meds; ice)    BALANCE  Static Sitting: Normal  Dynamic Sitting: Normal  Static Sta Transfers: NA  Stairs: NA  Curb Step: NA      Exercise/Education Provided:  Functional activity tolerated  Gait training  Transfer training      Patient End of Session: Up in chair;Needs met;Call light within reach;RN aware of session/findings; All patient TREATMENT NOTE - INPATIENT      Room Number: 363/363-A     Session: 1  Number of Visits to Meet Established Goals: 2    Presenting Problem: s/p R anterior BEATRIZ on 11/1/21    Problem List  Active Problems:    Pre-op testing      History related to current ad SURGICAL HISTORY      ex lap, LSO   • PATIENT DOCUMENTED NOT TO HAVE EXPERIENCED ANY OF THE FOLLOWING EVENTS Left 8/3/2015    Procedure: LEFT PHACOEMULSIFICATION OF CATARACT WITH INTRAOCULAR LENS IMPLANT 50180;  Surgeon: Monse Mayberry MD;  Location: [address] [place] [address] bedclothes, sheets and blankets)?: None   Patient Difficulty: Sitting down on and standing up from a chair with arms (e.g., wheelchair, bedside commode, etc.): None   Patient Difficulty: Moving from lying on back to sitting on the side of the bed?: None light within reach;RN aware of session/findings; All patient questions and concerns addressed;SCDs in place; Ice applied; Alarm set    ASSESSMENT   Pt with improved endurance for mobility, but still a little SOB after ambulation of 150 ft.      Pt remains belo A, PT (Physical Therapist)    Related Notes: Addendum by Christine Barrera PT (Physical Therapist) filed at 11/2/2021  2:13 PM  Original Note by Christine Barrera PT (Physical Therapist) filed at 11/2/2021  1:16 PM       PHYSICAL THERAPY HIP at [facility]   • HYSTERECTOMY     • IMPLANT LEFT  1970's and 2008    Had Left one replaced   • IMPLANT RIGHT  1970's   • INSERT BREAST PROS IMMED AFTER EXCIS  2/15/08    Performed by Shelley Gomez at [place] [address]. Standing: Fair -  Dynamic Standing: Fair -  ACTIVITY TOLERANCE  Room air    AM-PAC '6-Clicks' INPATIENT SHORT FORM - BASIC MOBILITY  How much difficulty does the patient currently have. ..   Patient Difficulty: Turning over in bed (including adjusting bedclo communications: in bubble chat with rn, Jack Fraser and Navya Tucker, regarding DC recommendation and pt performance. Updated mobility info sheet on bathroom door. Patient End of Session: Up in chair;Needs met;Call light within reach;RN aware of session/findings; Al of 3    Author: Luis Lucero PT Service: Rehab Author Type: Physical Therapist    Filed: 11/2/2021  1:16 PM Date of Service: 11/2/2021  1:08 PM Status: Signed    : Luis Lucero PT (Physical Therapist)    Related Notes: Addendu CATH DRUG ELUTING STENT      x1 stent- 2000   • COLONOSCOPY,DIAGNOSTIC  9/21/2010    Performed by Nubia Villa at [facility]   • HYSTERECTOMY     • IMPLANT LEFT  1970's and 2008    Had Left one replaced   • IMPLANT RIGHT  1970's   • INSERT B (Comment) (coordinate w/pain meds; ice)    BALANCE  Static Sitting: Normal  Dynamic Sitting: Normal  Static Standing: Fair -  Dynamic Standing: Fair -  ACTIVITY TOLERANCE  Room air    AM-PAC '6-Clicks' INPATIENT SHORT FORM - BASIC MOBILITY  How much diffic training    Staff communications: in bubble chat with rn, Lori Chapin and Jeremy So, regarding DC recommendation and pt performance. Updated mobility info sheet on bathroom door.      Patient End of Session: Up in chair;Needs met;Call light within reach;RN aware of s 3:10 PM  Version 1 of 1    Author: Yolanda Blanchard PT Service: Rehab Author Type: Physical Therapist    Filed: 11/1/2021  3:10 PM Date of Service: 11/1/2021  3:00 PM Status: Signed    : Yolanda Blanchard PT (Physical Therapist) COLONOSCOPY,DIAGNOSTIC  9/21/2010    Performed by Aurora Flores at [facility]   • HYSTERECTOMY     • IMPLANT LEFT  1970's and 2008    Had Left one replaced   • IMPLANT RIGHT  1970's   • INSERT BREAST PROS IMMED AFTER EXCIS  2/15/08    Perform High fall risk    WEIGHT BEARING RESTRICTION  Weight Bearing Restriction: R lower extremity        R Lower Extremity: Weight Bearing as Tolerated       PAIN ASSESSMENT  Ratin  Location: denies       COGNITION  · Overall Cognitive Status:  WFL - within assist  Stand to sit = cga    Simulated car transfer = NT    Skilled Therapy Provided: Rec'd pt in supine. Appropriately sized RW for pt. Rolling side to side and mod assist to don pull up briefs.   Instructed and demonstrated sit<>stand for pain cont patients with this level of impairment may benefit from HHPT. Based on this evaluation, patient's clinical presentation is stable and overall the evaluation complexity is considered low.     These impairments and comorbidities manifest themselves as fun 363/363-A  Evaluation Date: 11/2/2021     Type of Evaluation: Quick Eval  Presenting Problem: s/p R BEATRIZ 11/1/21    Physician Order: IP Consult to Occupational Therapy  Reason for Therapy:  ADL/IADL Dysfunction and Discharge Planning    History related to c OF CATARACT WITH INTRAOCULAR LENS IMPLANT 32960;  Surgeon: Mariano Mosqueda MD;  Location: [address] [place] [address]   • PATIENT [address] [place] [address] FOR IV ANTIBIOTIC SURGICAL SITE INFECTION PROPHYLAXIS.  Left 8/3/2015    Procedure: LEFT PHACOEMULSIFI Findings: None                ACTIVITY TOLERANCE                         O2 SATURATIONS  Oxygen Therapy  SPO2% on Room Air at Rest: 94    ACTIVITIES OF DAILY LIVING ASSESSMENT  AM-PAC ‘6-Clicks’ Inpatient Daily Activity Short Form   How much help from anot addressed;SCDs in place; Ice applied; Alarm set    ASSESSMENT   Patient is a 78year old female admitted 11/1/2021 for R BEATRIZ. Patient seen this am for OT evauation.  In this OT eval patient performed all ADL tasks, functional transfers and dynamic reaching sa therapist this session: Surgical mask, gloves  PPE worn by patient this session: None               Video Swallow Study Notes    No notes of this type exist for this encounter. SLP Notes    No notes of this type exist for this encounter.      Jolie Kessler Patient's Short Term Goal: have good pain control    Interventions:   - prn and scheduled pain medication  - See additional Care Plan goals for specific interventions

## (undated) NOTE — LETTER
Patient Name: Bertha Kaur CSN: [id number]  -Age / Sex: 1945-A: 78 y  female Medical Records: [id number]    ABNORMAL VALUES  Surgeon(s):  Madalyn Emanuel MD  Anesthesia Type: Choice  Procedure Description: RIGHT ANTERIOR TOTAL HIP ARTHROPLASTY   Prim

## (undated) NOTE — LETTER
OUTSIDE TESTING RESULT REQUEST     IMPORTANT: FOR YOUR IMMEDIATE ATTENTION  Please FAX all test results listed below to: [phone]     Testing already done on or about: 9/30/21     * * * * If testing is NOT complete, arrange with patient A.S.A.P. * * *